# Patient Record
(demographics unavailable — no encounter records)

---

## 2024-10-14 NOTE — PHYSICAL EXAM
[Chaperone Present] : A chaperone was present in the examining room during all aspects of the physical examination [24435] : A chaperone was present during the pelvic exam. [FreeTextEntry2] : mehul [Appropriately responsive] : appropriately responsive [Alert] : alert [No Acute Distress] : no acute distress [No Lymphadenopathy] : no lymphadenopathy [Regular Rate Rhythm] : regular rate rhythm [No Murmurs] : no murmurs [Clear to Auscultation B/L] : clear to auscultation bilaterally [Soft] : soft [Non-tender] : non-tender [Non-distended] : non-distended [No HSM] : No HSM [No Lesions] : no lesions [No Mass] : no mass [Oriented x3] : oriented x3 [Examination Of The Breasts] : a normal appearance [No Masses] : no breast masses were palpable [Labia Majora] : normal [Labia Minora] : normal [Normal] : normal [Uterine Adnexae] : normal

## 2024-10-14 NOTE — HISTORY OF PRESENT ILLNESS
[FreeTextEntry1] : 53-year-old white female para 2 presents as a new patient.  She is here for well visit.  Patient is postmenopausal and is sexually active denies any vaginal bleeding or vaginal dryness.  She is concerned about weight gain after menopause but she did also have an injury to her hand and could not exercise as vigorously as she did.  She has a new medical history of a thrombophilia and is followed by hematology as per her.  Surgical history of  x 2, salpingectomy ovarian cystectomy tonsillectomy.  Her daughters are 18 and 15.  She is .  She denies tobacco or drug use but drinks occasionally.  Patient reports family history of colon cancer in her maternal grandmother father and aunt she says she has had negative genetic testing.  She is due for mammogram.  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  Her  is the same but retired.

## 2024-10-14 NOTE — DISCUSSION/SUMMARY
[FreeTextEntry1] : Pap smear is performed.  Prescription for mammogram and DEXA scan was provided.  I discussed menopausal symptoms but she really denies.

## 2025-03-03 NOTE — HISTORY OF PRESENT ILLNESS
[postmenopausal] : postmenopausal [N] : Patient does not use contraception [Y] : Positive pregnancy history [No] : Patient does not have concerns regarding sex [Mammogramdate] : 1/3/2023 [TextBox_19] : BR 2 [BreastSonogramDate] : 1/3/2023 [TextBox_25] : BR 2 [PapSmeardate] : 10/14/2024 [TextBox_31] : NEG [ColonoscopyDate] : 2024 [TextBox_43] : DUE IN 5 YEARS [HPVDate] : 10/14/2024 [TextBox_78] : NEG [LMPDate] : 1/2020 [PGHxTotal] : 3 [[facility]] : 2 [[facility]] : 2 [PGxEctopic] : 1 [FreeTextEntry1] : 2021

## 2025-03-03 NOTE — PLAN
[FreeTextEntry1] : D/w pt the need for evaluation of PMB will order TVUS RTC for sono and EMB Advised pt on importance of this follow up to r/o dysplasia

## 2025-03-03 NOTE — HISTORY OF PRESENT ILLNESS
[postmenopausal] : postmenopausal [N] : Patient does not use contraception [Y] : Positive pregnancy history [No] : Patient does not have concerns regarding sex [Mammogramdate] : 1/3/2023 [TextBox_19] : BR 2 [BreastSonogramDate] : 1/3/2023 [TextBox_25] : BR 2 [PapSmeardate] : 10/14/2024 [TextBox_31] : NEG [ColonoscopyDate] : 2024 [TextBox_43] : DUE IN 5 YEARS [HPVDate] : 10/14/2024 [TextBox_78] : NEG [LMPDate] : 1/2020 [PGHxTotal] : 3 [[facility]] : 2 [[facility]] : 2 [PGxEctopic] : 1 [FreeTextEntry1] : 2021

## 2025-03-07 NOTE — HISTORY OF PRESENT ILLNESS
[N] : Patient does not use contraception [Y] : Patient is sexually active [Mammogramdate] : 1/3/23 [TextBox_19] : BR2 [BreastSonogramDate] : 1/3/23 [TextBox_25] : BR2 [PapSmeardate] : 10/14/24 [TextBox_31] : NEG [HPVDate] : 10/14/24 [LMPDate] : 1/2020 [TextBox_78] : NEG [PGHxTotal] : 3 [[facility]] : 2 [[facility]] : 2 [PGxEctopic] : 1 [FreeTextEntry1] : 1/2020

## 2025-03-07 NOTE — PLAN
[FreeTextEntry1] : sono d/w pt Unable to see polyp on cervix stenotic os- unable to perform EMB Will refer pt to see GYN MD for hysteroscopy will send Cytotec to prepare- d/w pt Per request, breast and Dexa imaging screening ordered

## 2025-03-07 NOTE — PHYSICAL EXAM
[Chaperone Present] : A chaperone was present in the examining room during all aspects of the physical examination [Labia Majora] : normal [Labia Minora] : normal [No Bleeding] : There was no active vaginal bleeding [Cervical Stenosis] : cervical stenosis [Normal] : normal [Uterine Adnexae] : normal [FreeTextEntry2] : cam [FreeTextEntry5] : NO POLYP NOTED- OS STENOTIC, UABLE TO PASS PIPELLE FOR BX